# Patient Record
Sex: FEMALE | Race: WHITE | NOT HISPANIC OR LATINO | Employment: FULL TIME | URBAN - METROPOLITAN AREA
[De-identification: names, ages, dates, MRNs, and addresses within clinical notes are randomized per-mention and may not be internally consistent; named-entity substitution may affect disease eponyms.]

---

## 2021-03-08 ENCOUNTER — APPOINTMENT (OUTPATIENT)
Dept: GENERAL RADIOLOGY | Facility: HOSPITAL | Age: 26
End: 2021-03-08

## 2021-03-08 ENCOUNTER — HOSPITAL ENCOUNTER (EMERGENCY)
Facility: HOSPITAL | Age: 26
Discharge: HOME | End: 2021-03-08
Attending: EMERGENCY MEDICINE | Admitting: EMERGENCY MEDICINE

## 2021-03-08 VITALS
SYSTOLIC BLOOD PRESSURE: 132 MMHG | RESPIRATION RATE: 16 BRPM | DIASTOLIC BLOOD PRESSURE: 72 MMHG | HEART RATE: 96 BPM | TEMPERATURE: 97.5 F | OXYGEN SATURATION: 98 %

## 2021-03-08 DIAGNOSIS — S42.292A HUMERAL HEAD FRACTURE, LEFT, CLOSED, INITIAL ENCOUNTER: Primary | ICD-10-CM

## 2021-03-08 PROCEDURE — 99283 EMERGENCY DEPT VISIT LOW MDM: CPT

## 2021-03-08 PROCEDURE — 73030 X-RAY EXAM OF SHOULDER: CPT

## 2021-03-08 RX ORDER — LAMOTRIGINE 100 MG/1
100 TABLET ORAL DAILY
COMMUNITY
End: 2023-03-03

## 2021-03-08 RX ORDER — ESCITALOPRAM OXALATE 10 MG/1
10 TABLET ORAL DAILY
COMMUNITY
End: 2022-11-23

## 2021-03-08 NOTE — ED TRIAGE NOTES
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  Patient was wearing a face mask throughout encounter.  I wore personal protective equipment throughout the encounter.  Hand hygiene was performed before and after patient encounter.

## 2021-03-08 NOTE — ED PROVIDER NOTES
12:42 EST  Patient seen and examined with physician assistant.  Briefly patient presents for evaluation of left shoulder injury.  Patient states he is a  and fell on her left shoulder.  Patient is right-hand dominant.  Pain with movement of left shoulder.  Has had no weakness or numbness in her arm.  No head injury or neck pain.        On exam patient has tenderness to her left shoulder.  Has normal strength in her hand and normal sensation.      X-ray shows greater trochanter fracture        Plan will be sling and follow-up with orthopedist      MD ATTESTATION NOTE    The BUDDY and I have discussed this patient's history, physical exam, and treatment plan.  I have reviewed the documentation and personally had a face to face interaction with the patient. I affirm the documentation and agree with the treatment and plan.  The attached note describes my personal findings.      Patient was wearing a face mask when I entered the room and they continued to wear a mask throughout their stay in the ED.  I wore PPE, including  gloves, face mask with shield or face mask with goggles whenever I was in the room with patient.      Evan Fish MD  03/08/21 0052

## 2021-03-08 NOTE — PROGRESS NOTES
Entered room, introduced self and explained role w/mask in place on self and patient. Verified information on facesheet and that patient is currently without a PCP. Provided [facility] provider list and instructed patient on contact information to obtain an appt. No further needs at this time. Lesly Hartman RN

## 2021-03-08 NOTE — ED PROVIDER NOTES
EMERGENCY DEPARTMENT ENCOUNTER    Room Number:  05/05  Date of encounter:  3/8/2021  PCP: Provider, No Known  Historian: Patient      I used full protective equipment while examining this patient.  This includes face mask, gloves and protective eyewear.  I washed my hands before entering the room and immediately upon leaving the room      HPI:  Chief Complaint: Left shoulder injury  A complete HPI/ROS/PMH/PSH/SH/FH are unobtainable due to: Nothing    Context: Lexie Gonzalez is a 26 y.o. female who presents to the ED c/o left shoulder injury last night.  Patient is a  and injured her left shoulder during a match last night.  Patient was on her hands and knees when another wrestler jumped onto her back.  Patient's left shoulder was forcibly pushed posteriorly.  Today patient has had pain and decreased range of motion.  She denies any numbness or tingling to her distal arm.  She denies any other injuries from the night.  She is right-handed.  She denies any previous left shoulder injuries.    Review of Medical Records  Reviewed patient's urgent care visit from 12/13/2020.  Patient being tested for COVID-19.    PAST MEDICAL HISTORY  Active Ambulatory Problems     Diagnosis Date Noted   • No Active Ambulatory Problems     Resolved Ambulatory Problems     Diagnosis Date Noted   • No Resolved Ambulatory Problems     No Additional Past Medical History         PAST SURGICAL HISTORY  No past surgical history on file.      FAMILY HISTORY  No family history on file.      SOCIAL HISTORY  Social History     Socioeconomic History   • Marital status: Single     Spouse name: Not on file   • Number of children: Not on file   • Years of education: Not on file   • Highest education level: Not on file         ALLERGIES  Patient has no known allergies.        REVIEW OF SYSTEMS  All systems reviewed and negative except for those discussed in HPI.       PHYSICAL EXAM    I have reviewed the triage vital signs and nursing  notes.    ED Triage Vitals [03/08/21 1030]   Temp Heart Rate Resp BP SpO2   97.5 °F (36.4 °C) 96 16 -- 98 %      Temp src Heart Rate Source Patient Position BP Location FiO2 (%)   Tympanic Monitor -- -- --       Physical Exam  GENERAL: Alert, oriented, not distressed  HENT: head atraumatic, no nuchal rigidity  EYES: no scleral icterus, EOMI  CV: regular rhythm, regular rate, no murmur  RESPIRATORY: normal effort, CTA  ABDOMEN: soft, nontender  MUSCULOSKELETAL: Moderate diffuse swelling to left shoulder.  Decreased range of motion.  Moderate diffuse tenderness.  Left elbow normal.  Neurovascular intact distally.  NEURO: alert, moves all extremities, follows commands  SKIN: warm, dry    RADIOLOGY  XR Shoulder 2+ View Left    Result Date: 3/8/2021  LEFT SHOULDER X-RAY  HISTORY: Shoulder injury yesterday.  TECHNIQUE: A total of three images of the left shoulder are provided.  FINDINGS: There is a nondisplaced fracture across the base of the proximal humerus greater tuberosity. No transverse fracture across the metaphysis is appreciated. Humeral head appears normally located. The visualized portions of the clavicle, scapula, and left thoracic cage appear normal. The joints around the shoulder appear normal as well.      Acute, nondisplaced fracture of the left humeral greater tuberosity.  This report was finalized on 3/8/2021 11:51 AM by Dr. Naveen Junior M.D.        I ordered the above noted radiological studies. Reviewed by me and discussed with radiologist.  See dictation for official radiology interpretation.      PROCEDURES  Patient placed in sling and swath by ER technician.  Supervised by myself.  Neurovascular intact distally.    MEDICATIONS GIVEN IN ER    Medications - No data to display      PROGRESS, DATA ANALYSIS, CONSULTS, AND MEDICAL DECISION MAKING    All labs have been independently reviewed by me.  All radiology studies have been reviewed by me and discussed with radiologist dictating the report.    EKG's independently viewed and interpreted by me.  Discussion below represents my analysis of pertinent findings related to patient's condition, differential diagnosis, treatment plan and final disposition.    I have discussed case with Dr. Fish, emergency room physician.  He has performed his own bedside examination and agrees with treatment plan.    ED Course as of Mar 08 1457   Mon Mar 08, 2021   1104 Patient presents with left shoulder injury after wrestling accident last night.  Plan for x-rays and reevaluation.  Differential diagnoses include but not limited to fracture, dislocation, RTC strain.    [EE]   1145 Left shoulder x-rays interpreted by myself show a nondisplaced greater tuberosity fracture.  I will await final radiologist interpretation.    [EE]   1201 Updated patient on x-ray findings.  She agrees with discharge plan.  We will have her follow-up with an orthopedic doctor.  She declines pain medication prescription.    [EE]      ED Course User Index  [EE] Anthony Hartley PA       AS OF 14:57 EST VITALS:    BP - 132/72  HR - 96  TEMP - 97.5 °F (36.4 °C) (Tympanic)  O2 SATS - 98%        DIAGNOSIS  Final diagnoses:   Humeral head fracture, left, closed, initial encounter         DISPOSITION  Discharged           Anthony Hartley PA  03/08/21 2277

## 2021-03-10 ENCOUNTER — TELEPHONE (OUTPATIENT)
Dept: ORTHOPEDIC SURGERY | Facility: CLINIC | Age: 26
End: 2021-03-10

## 2021-03-12 ENCOUNTER — OFFICE VISIT (OUTPATIENT)
Dept: ORTHOPEDIC SURGERY | Facility: CLINIC | Age: 26
End: 2021-03-12

## 2021-03-12 VITALS — TEMPERATURE: 98 F | WEIGHT: 155 LBS | BODY MASS INDEX: 23.49 KG/M2 | HEIGHT: 68 IN

## 2021-03-12 DIAGNOSIS — S49.92XA INJURY OF LEFT SHOULDER, INITIAL ENCOUNTER: Primary | ICD-10-CM

## 2021-03-12 PROCEDURE — 99203 OFFICE O/P NEW LOW 30 MIN: CPT | Performed by: NURSE PRACTITIONER

## 2021-03-12 PROCEDURE — 73030 X-RAY EXAM OF SHOULDER: CPT | Performed by: NURSE PRACTITIONER

## 2021-03-16 NOTE — PROGRESS NOTES
History & Physical     Patient: Lexie Gonzalez    YOB: 1995    Medical Record Number: [id number]    Chief Complaints: Left shoulder injury    History of Present Illness: 26 y.o. female presents for evaluation of the left shoulder.  She experienced a wrestling injury 5 days ago.  She was down on the mat when an opponent came down on her back near the shoulder.  She experienced immediate pain and was promptly removed from the wrestling ring.  She was evaluated with x-rays the following day at [facility] emergency department.   She was placed in a sling and referred to me for further evaluation.  Describes her current pain is mild, constant, aching. The pain is worse with movement.  The pain is better with rest, pain medicine and use of the sling.  Denies any other injuries or complaints.  The patient is right hand dominant.    Allergies: No Known Allergies    Home Medications:      Current Outpatient Medications:   •  escitalopram (LEXAPRO) 10 MG tablet, Take 10 mg by mouth Daily., Disp: , Rfl:   •  lamoTRIgine (LaMICtal) 100 MG tablet, Take 100 mg by mouth Daily., Disp: , Rfl:     Past Medical History:   Diagnosis Date   • Fracture, foot     right          Past Surgical History:   Procedure Laterality Date   • WISDOM TOOTH EXTRACTION            Social History     Occupational History   • Not on file   Tobacco Use   • Smoking status: Never Smoker   • Smokeless tobacco: Never Used   Vaping Use   • Vaping Use: Never used   Substance and Sexual Activity   • Alcohol use: Never   • Drug use: Never   • Sexual activity: Defer      Social History     Social History Narrative   • Not on file          Family History   Problem Relation Age of Onset   • No Known Problems Mother    • No Known Problems Father    • No Known Problems Sister    • No Known Problems Brother    • No Known Problems Maternal Aunt    • No Known Problems Maternal Uncle    • No Known Problems Paternal Aunt    • No Known Problems  "Paternal Uncle    • No Known Problems Maternal Grandmother    • No Known Problems Maternal Grandfather    • No Known Problems Paternal Grandmother    • No Known Problems Paternal Grandfather    • No Known Problems Other    • Anesthesia problems Neg Hx    • Broken bones Neg Hx    • Cancer Neg Hx    • Clotting disorder Neg Hx    • Collagen disease Neg Hx    • Diabetes Neg Hx    • Dislocations Neg Hx    • Osteoporosis Neg Hx    • Rheumatologic disease Neg Hx    • Scoliosis Neg Hx    • Severe sprains Neg Hx        Review of Systems:      Constitutional: Denies fever, shaking or chills   Eyes: Denies change in visual acuity   HEENT: Denies nasal congestion or sore throat   Respiratory: Denies cough or shortness of breath   Cardiovascular: Denies chest pain or edema  Endocrine: Denies tremors, palpitations, intolerance of heat or cold, polyuria, polydipsia.  GI: Denies abdominal pain, nausea, vomiting, bloody stools or diarrhea  : Denies frequency, urgency, incontinence, retention, or nocturia.  Musculoskeletal: Denies numbness tingling or loss of motor function except as above  Integument: Denies rash, lesion or ulceration   Neurologic: Denies headache or focal weakness, deficits  Heme: Denies epistaxis, spontaneous or excessive bleeding, epistaxis, hematuria, melena, fatigue, enlarged or tender lymph nodes.      All other pertinent positives and negatives as noted above in HPI.    Physical Exam: 26 y.o. female    Vitals:    03/12/21 1456   Temp: 98 °F (36.7 °C)   Weight: 70.3 kg (155 lb)   Height: 172.7 cm (68\")       General:  Patient is awake and alert.  Appears in no acute distress or discomfort.    Psych:  Affect and demeanor are appropriate.    Eyes:  Conjunctiva and sclera appear grossly normal.  Eyes track well and EOM seem to be intact.    Dentition:  No gross abnormalities noted.    Ears:  No gross abnormalities.  Hearing adequate for the exam.    Cardiovascular:  Regular rate and rhythm.    Lungs:  Good " chest expansion.  Breathing unlabored.    Lymph:  No palpable masses or adenopathy in the affected extremity    Left upper extremity:  Sling was in place and removed.  Skin appears benign.  No gross malalignment, lacerations or abrasions.  Focal tenderness noted diffusely about the shoulder.  No palpable masses or adenopathy.  Compartments soft.  Painful, limited ROM of the shoulder.  Could not assess stability.  No tenderness at the elbow, forearm, wrist or hand.  Good strength in the wrist with flexion and extension as well as , pinch, finger and thumb abduction strength.  Intact sensation.  Brisk cap refill.  Palpable radial pulse.      Diagnostic Tests:  No results found for: GLUCOSE, CALCIUM, NA, K, CO2, CL, BUN, CREATININE, EGFRIFAFRI, EGFRIFNONA, BCR, ANIONGAP  No results found for: WBC, HGB, HCT, MCV, PLT  No results found for: INR, PROTIME    Imaging:  AP, scapular Y, and axillary views of the left shoulder are ordered by myself and reviewed to evaluate the patient's complaint.  These are compared to previous outside x-rays.  The x-rays show what appears to be a nondisplaced fracture at the base of the greater tuberosity.  Otherwise, no lesions, masses, significant degenerative changes, or other concerning findings.  The acromiohumeral interval is normal.    Assessment:  Left shoulder injury, suspect proximal humerus fracture    Plan: We had a thorough discussion regarding the risks of non-surgical management versus surgery.  I explained that there is good evidence that these fractures can heal with conservative treatment.  I further explained that it is very important to stay in the sling and minimize movement in the shoulder for now.  With closed treatment, there is the risk of further displacement, malunion, nonunion or persistent pain or loss of motion/function that could require further intervention in the future.  I consider that this injury is amenable to closed treatment.  The patient voiced  understanding of the risks, benefits, and alternative forms of treatment that were discussed and the patient consents to proceed with closed treatment.  The patient is instructed to continue use of the sling which she may remove for showering and dressing.  She may continue to use ice, ibuprofen, or Tylenol for pain.  The risks of these medications were discussed.  She is very concerned about when she will be able to return to wrestling.  I explained with this type of injury, she should expect to be out of wrestling for 3 months.  She will follow up with Dr. Muñoz in 1 week with repeat x-rays.  All questions posed by the patient were answered in detail.    Date: 3/16/2021    SHARI Valenzuela    CC to Provider, No Known

## 2021-03-19 ENCOUNTER — OFFICE VISIT (OUTPATIENT)
Dept: ORTHOPEDIC SURGERY | Facility: CLINIC | Age: 26
End: 2021-03-19

## 2021-03-19 VITALS — BODY MASS INDEX: 23.49 KG/M2 | TEMPERATURE: 97.2 F | WEIGHT: 155 LBS | HEIGHT: 68 IN

## 2021-03-19 DIAGNOSIS — M25.512 LEFT SHOULDER PAIN, UNSPECIFIED CHRONICITY: Primary | ICD-10-CM

## 2021-03-19 PROCEDURE — 99214 OFFICE O/P EST MOD 30 MIN: CPT | Performed by: ORTHOPAEDIC SURGERY

## 2021-03-19 PROCEDURE — 23620 CLTX GR HMRL TBRS FX WO MNPJ: CPT | Performed by: ORTHOPAEDIC SURGERY

## 2021-03-19 PROCEDURE — 73030 X-RAY EXAM OF SHOULDER: CPT | Performed by: ORTHOPAEDIC SURGERY

## 2021-03-19 NOTE — PROGRESS NOTES
Patient:Lexie Gonzalez    YOB: 1995    Medical Record Number:[id number]    Chief Complaints: Left shoulder injury    History of Present Illness:     26 y.o. female patient who presents for a left shoulder injury.  She was referred by Yadira.  She is a .  She fell onto her left shoulder approximately 2 weeks ago.  She was seen in the emergency room diagnosed with a proximal humerus fracture.  She was placed in a sling.  She has been compliant with wearing the sling.  Current pain is described as mild and aching.  She says that her pain has gotten much better since the injury.  She is right-hand dominant.    Allergies:No Known Allergies    Home Medications:    Current Outpatient Medications:   •  escitalopram (LEXAPRO) 10 MG tablet, Take 10 mg by mouth Daily., Disp: , Rfl:   •  lamoTRIgine (LaMICtal) 100 MG tablet, Take 100 mg by mouth Daily., Disp: , Rfl:     Past Medical History:   Diagnosis Date   • Fracture, foot     right       Past Surgical History:   Procedure Laterality Date   • WISDOM TOOTH EXTRACTION         Social History     Occupational History   • Not on file   Tobacco Use   • Smoking status: Never Smoker   • Smokeless tobacco: Never Used   Vaping Use   • Vaping Use: Never used   Substance and Sexual Activity   • Alcohol use: Never   • Drug use: Never   • Sexual activity: Defer      Social History     Social History Narrative   • Not on file       Family History   Problem Relation Age of Onset   • No Known Problems Mother    • No Known Problems Father    • No Known Problems Sister    • No Known Problems Brother    • No Known Problems Maternal Aunt    • No Known Problems Maternal Uncle    • No Known Problems Paternal Aunt    • No Known Problems Paternal Uncle    • No Known Problems Maternal Grandmother    • No Known Problems Maternal Grandfather    • No Known Problems Paternal Grandmother    • No Known Problems Paternal Grandfather    • No Known Problems Other    •  "Anesthesia problems Neg Hx    • Broken bones Neg Hx    • Cancer Neg Hx    • Clotting disorder Neg Hx    • Collagen disease Neg Hx    • Diabetes Neg Hx    • Dislocations Neg Hx    • Osteoporosis Neg Hx    • Rheumatologic disease Neg Hx    • Scoliosis Neg Hx    • Severe sprains Neg Hx        Review of Systems:      Constitutional: Denies fever, shaking or chills   Eyes: Denies change in visual acuity   HEENT: Denies nasal congestion or sore throat   Respiratory: Denies cough or shortness of breath   Cardiovascular: Denies chest pain or edema  Endocrine: Denies tremors, palpitations, intolerance of heat or cold, polyuria, polydipsia.  GI: Denies abdominal pain, nausea, vomiting, bloody stools or diarrhea  : Denies frequency, urgency, incontinence, retention, or nocturia.  Musculoskeletal: Denies numbness, tingling or loss of motor function except as above  Integument: Denies rash, lesion or ulceration   Neurologic: Denies headache or focal weakness, deficits  Heme: Denies spontaneous or excessive bleeding, epistaxis, hematuria, melena, fatigue, enlarged or tender lymph nodes.      All other pertinent positives and negatives as noted above in HPI.    Physical Exam:26 y.o. female  Vitals:    03/19/21 1533   Temp: 97.2 °F (36.2 °C)   Weight: 70.3 kg (155 lb)   Height: 172.7 cm (68\")       General:  Patient is awake and alert.  Appears in no acute distress or discomfort.    Psych:  Affect and demeanor are appropriate.    Extremities: Left shoulder is examined.  Skin is benign.  Moderate tenderness at the proximal humerus.  No step-offs or palpable defects.  Pendulums and short arc passive motion are well-tolerated.  Her arm and forearm are soft and easily compressible.  No tenderness in her lower arm, elbow, forearm, wrist or hand.  Good motor and sensory function in her wrist and hand.  Brisk capillary refill.    Imaging: AP and scapular Y views left shoulder are ordered and reviewed.  These are compared to previous " x-rays.  She has what appears to be a nondisplaced greater tuberosity fracture.  There has been no change in alignment relative to previous films.    Assessment/Plan: Nondisplaced left greater tuberosity fracture    I recommend conservative treatment.  She can start to work on pendulum exercises.  She was counseled about how to perform these.  We discussed appropriate activity modification and restrictions.  I recommend that she continue use of the sling for now.  I will see her back in 2 weeks for repeat x-rays.    Santiago Muñoz MD    03/19/2021

## 2021-04-16 ENCOUNTER — OFFICE VISIT (OUTPATIENT)
Dept: ORTHOPEDIC SURGERY | Facility: CLINIC | Age: 26
End: 2021-04-16

## 2021-04-16 VITALS — TEMPERATURE: 97.8 F | BODY MASS INDEX: 23.49 KG/M2 | WEIGHT: 155 LBS | HEIGHT: 68 IN

## 2021-04-16 DIAGNOSIS — Z09 FRACTURE FOLLOW-UP: ICD-10-CM

## 2021-04-16 DIAGNOSIS — R52 PAIN: Primary | ICD-10-CM

## 2021-04-16 PROCEDURE — 99024 POSTOP FOLLOW-UP VISIT: CPT | Performed by: ORTHOPAEDIC SURGERY

## 2021-04-16 PROCEDURE — 73030 X-RAY EXAM OF SHOULDER: CPT | Performed by: ORTHOPAEDIC SURGERY

## 2021-04-16 NOTE — PROGRESS NOTES
"Lexie Gonzalez : 1995 MRN: [id number] DATE: 2021    CC: Closed treatment of left proximal humerus fracture    HPI: Pt. returns to clinic today for follow-up.  Reports that the pain continues to improve.  Denies any new concerns or issues.    Vitals:    21 1346   Temp: 97.8 °F (36.6 °C)   Weight: 70.3 kg (155 lb)   Height: 172.7 cm (68\")       Exam:  Contour of shoulder appears normal.  Skin intact and benign.  Arm and forearm soft.  Shoulder motion is limited but well tolerated.  Good motor and sensory function distally.  Palpable pulse with good cap refill.      Imaging  AP and scapular Y views of the shoulder are ordered and reviewed by me to evaluate alignment and for comparison purposes.  No new or concerning findings noted.  Fracture appears to be healing.  No change in alignment.    Impression:   6 weeks s/p closed treatment of left proximal humerus fracture    Plan:    1.  Can begin to work on progressive range of motion.  Start formal PT.  2.  F/u in 6 weeks with repeat 2v xrays.  3.  Counseled the patient about appropriate activity modifications and restrictions, including no heavy lifting, pushing or pulling  4.  Follow-up in 6 weeks.    Santiago Muñoz MD    "

## 2021-04-21 ENCOUNTER — TREATMENT (OUTPATIENT)
Dept: PHYSICAL THERAPY | Facility: CLINIC | Age: 26
End: 2021-04-21

## 2021-04-21 DIAGNOSIS — Z78.9 IMPAIRED MOBILITY AND ADLS: ICD-10-CM

## 2021-04-21 DIAGNOSIS — Z74.09 IMPAIRED MOBILITY AND ADLS: ICD-10-CM

## 2021-04-21 DIAGNOSIS — M25.512 ACUTE PAIN OF LEFT SHOULDER: Primary | ICD-10-CM

## 2021-04-21 PROCEDURE — 97161 PT EVAL LOW COMPLEX 20 MIN: CPT | Performed by: PHYSICAL THERAPIST

## 2021-04-21 NOTE — PROGRESS NOTES
Physical Therapy Initial Evaluation and Plan of Care      Patient: Lexie Gonzalez   : 1995  Diagnosis/ICD-10 Code:  Acute pain of left shoulder [M25.512]  Referring practitioner: Santiago Muñoz MD  Date of Initial Visit: 2021  Today's Date: 2021  Patient seen for 1 sessions           Subjective Evaluation    History of Present Illness  Mechanism of injury: Patient injured her L shoulder on  when she fell while wrestling. Patient was in quadruped and her opponent placed his bodyweight on her when he slammed his body onto her back. The injury caused a proximal humeral head fracture. Patient was placed in a sling and managed conservatively.     Patient is out of the sling and was educated to avoid lifting and puling with her left arm.     She has discomfort with putting on her shirt. She describes the pain as an ache. She also has pain with shoulder flexion and ER.      Patient Occupation:  Pain  Current pain ratin  At best pain ratin  At worst pain rating: 3  Location: L shoulder  Quality: dull ache  Relieving factors: ice, relaxation, rest and support  Aggravating factors: overhead activity, lifting and repetitive movement  Progression: improved    Social Support  Lives with: two roommates.    Hand dominance: right    Diagnostic Tests  Abnormal x-ray: healing proximal humeral head frature.    Treatments  Previous treatment: immobilization (sling)  Patient Goals  Patient goals for therapy: increased strength, decreased pain, increased motion, return to sport/leisure activities and return to work             Objective          Static Posture     Comments  L Shoulder slightly elevated compared to R. Increased thoracic kyphosis, rounded shoulders, forward head,    Active Range of Motion   Left Shoulder   Flexion: 135 degrees with pain  Abduction: 110 degrees with pain  External rotation 0°: 74 degrees   Internal rotation BTB: T8     Right Shoulder   Flexion: 180  degrees WFL  Abduction: 180 degrees WFL  External rotation 0°: 75 degrees   Internal rotation BTB: T8     Joint Play   Left Shoulder  Hypermobile in the anterior capsule, posterior capsule and inferior capsule.     Strength/Myotome Testing     Left Shoulder     Planes of Motion   Flexion: 4-   Abduction: 4-   External rotation at 0°: 4   Internal rotation at 0°: 4+     Isolated Muscles   Biceps: 4+     Right Shoulder     Planes of Motion   Flexion: 5   Abduction: 5   External rotation at 0°: 4+   Internal rotation at 0°: 5     Isolated Muscles   Biceps: 5         See Exercise, Manual, and Modality Logs for complete treatment.       Functional Outcome Score: DASH: 15.6% disability      Assessment & Plan     Assessment  Impairments: abnormal or restricted ROM, activity intolerance, impaired physical strength, lacks appropriate home exercise program and pain with function  Assessment details: Lexie Gonzalez is a 26 y.o. 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 She presents with a evolving clinical presentation.  She has no relevant co morbidities. Personal factors include patient having a very physically demanding job that she would like to return to.  Signs and symptoms are consistent with physical therapy diagnosis of acute L shoulder pain and difficulty with ADLs. Patient is appropriate for skilled physical therapy in order to reduce pain and increase ease with daily mobility.   Prognosis: good  Functional Limitations: carrying objects, lifting, sleeping, pulling, pushing, uncomfortable because of pain, reaching behind back and reaching overhead  Goals  Plan Goals: STGs (To be completed in 30 days)    -Patient will demonstrate compliance and independence with initial HEP  -Patient will increase L shoulder flexion AROM to 160 degrees or greater to increase ease with overhead activitiy  -Patient will report reduction  in pain to <2/10 to allow better quality of sleep    LTGs (To be completed in 90 days)  -Patient will be knowledgeable with advanced HEP to facilitate independent management of condition  -Patient will increase L shoulder flexion AROM to 170 degrees or greater to increase ease with overhead activitiy  -Patient will reduce perceived disability on DASH from 15.6% disability at eval to <10% or less to improve quality of life    Plan  Therapy options: will be seen for skilled physical therapy services  Planned modality interventions: TENS and ultrasound  Planned therapy interventions: ADL retraining, flexibility, transfer training, stretching, strengthening, postural training, manual therapy, soft tissue mobilization, therapeutic activities, home exercise program, functional ROM exercises, joint mobilization and motor coordination training  Frequency: 36 visits.  Plan details: Skilled PT for Shoulder ROM/Strength, joint mobility, ADLs retraining, and posture. Manual therapy and modalities PRN for pain relief.        Timed:  Manual Therapy:         mins  72119;  Therapeutic Exercise:         mins  66938;     Neuromuscular Rodriguez:        mins  67875;    Therapeutic Activity:          mins  46876;     Gait Training:           mins  98275;     Ultrasound:          mins  81154;    Electrical Stimulation:         mins  86748 ( );  Iontophoresis         mins 31856  Dry Needling        mins      Untimed:  Electrical Stimulation:         mins  74789 ( );  Mechanical Traction:         mins  96251;     Timed Treatment:   0   mins   Total Treatment:     30   mins    PT SIGNATURE: Amira Do PT   DATE TREATMENT INITIATED: 4/21/2021    Initial Certification  Certification Period: 7/20/2021  I certify that the therapy services are furnished while this patient is under my care.  The services outlined above are required by this patient, and will be reviewed every 90 days.     PHYSICIAN: Santiago Muñoz MD      DATE:      Please sign and return via fax to [phone] Thank you, [facility].

## 2021-04-29 ENCOUNTER — TREATMENT (OUTPATIENT)
Dept: PHYSICAL THERAPY | Facility: CLINIC | Age: 26
End: 2021-04-29

## 2021-04-29 DIAGNOSIS — Z78.9 IMPAIRED MOBILITY AND ADLS: ICD-10-CM

## 2021-04-29 DIAGNOSIS — Z74.09 IMPAIRED MOBILITY AND ADLS: ICD-10-CM

## 2021-04-29 DIAGNOSIS — M25.512 ACUTE PAIN OF LEFT SHOULDER: Primary | ICD-10-CM

## 2021-04-29 PROCEDURE — 97140 MANUAL THERAPY 1/> REGIONS: CPT | Performed by: PHYSICAL THERAPIST

## 2021-04-29 PROCEDURE — 97110 THERAPEUTIC EXERCISES: CPT | Performed by: PHYSICAL THERAPIST

## 2021-04-29 NOTE — PROGRESS NOTES
Physical Therapy Daily Progress Note    Patient: Lexie Gonzalez   : 1995  Diagnosis/ICD-10 Code:  Acute pain of left shoulder [M25.512]  Referring practitioner: Santiago Muñoz MD  Date of Initial Visit: Type: THERAPY  Noted: 2021  Today's Date: 2021  Patient seen for 2 sessions           Subjective   Patient reports her shoulder is feeling a lot better but it's a little challenging still for her to hook her bra.    Objective   See Exercise, Manual, and Modality Logs for complete treatment.     EXERCISES:  -Cane flex/ER AAROM 10x10 sec  -Sidelying ER 2 lb, 2x15  -Supine shoulder ABCs, arm at 90 degrees flex, x1 with 1 lb weight  -Sleeper stretch IR, 4x20 sec  -B Shoulder Horizontal abduction in supine with RTB, 2x15  -B Shoulder ER in supine with RTB, 2x15  -Shoudler flexion to 90 degrees in sitting, 2 lbs, 2x15  -Cable Rows 15 lbs, 2x15  -Cable Shoulder extension 20- lbs, 2x15    MANUAL:  L Shoulder PROM with gentle overpressure at eng range. Grade III A-P and Sup-Inf joint mobilizations with gentle oscillations in between for pain.    Assessment/Plan  Patient demonstrates nearly full L Shoulder PROM in supine. Significant crackling noted on eccentric phase of flexion/abduction when initiating manual therapy but the movement became more smooth and less painful after performing joint mobilizations and gentle oscillations. No pain noted with shoulder exercises but patient reports difficulty with B shoulder ER due to muscle weakness.         Timed:    Manual Therapy:    15     mins  12238;  Therapeutic Exercise:    25     mins  30966;     Neuromuscular Rodriguez:        mins  04025;    Therapeutic Activity:          mins  79784;     Gait Training:           mins  43315;     Ultrasound:          mins  85598;    Electrical Stimulation:         mins  41791 ( );  Iontophoresis         mins 31736;  Aquatic Therapy         mins 91632;  Dry Needling                   mins    Untimed:  Electrical  Stimulation:         mins  72168 ( );  Mechanical Traction:         mins  36759;     Timed Treatment:   40   mins   Total Treatment:     40   mins  Amira Do PT  Physical Therapist

## 2021-05-03 ENCOUNTER — TELEPHONE (OUTPATIENT)
Dept: ORTHOPEDIC SURGERY | Facility: CLINIC | Age: 26
End: 2021-05-03

## 2021-05-03 ENCOUNTER — TREATMENT (OUTPATIENT)
Dept: PHYSICAL THERAPY | Facility: CLINIC | Age: 26
End: 2021-05-03

## 2021-05-03 DIAGNOSIS — Z78.9 IMPAIRED MOBILITY AND ADLS: ICD-10-CM

## 2021-05-03 DIAGNOSIS — M25.512 ACUTE PAIN OF LEFT SHOULDER: Primary | ICD-10-CM

## 2021-05-03 DIAGNOSIS — Z74.09 IMPAIRED MOBILITY AND ADLS: ICD-10-CM

## 2021-05-03 PROCEDURE — 97140 MANUAL THERAPY 1/> REGIONS: CPT | Performed by: PHYSICAL THERAPIST

## 2021-05-03 PROCEDURE — 97110 THERAPEUTIC EXERCISES: CPT | Performed by: PHYSICAL THERAPIST

## 2021-05-03 NOTE — PATIENT INSTRUCTIONS
Access Code: [email or web address]  URL: [email or web address]  Date: 05/03/2021  Prepared by: Citlali Sepulveda    Exercises  Sidelying Shoulder External Rotation - 1 x daily - 2 sets - 10 reps - 3 hold  Prone Shoulder Row - 1 x daily - 2 sets - 10 reps - 3 hold  Prone Shoulder Extension - Single Arm - 1 x daily - 10 reps - 2 sets - 3 hold  Prone Single Arm Shoulder Horizontal Abduction with Scapular Retraction and Palm Down - 1 x daily - 2 sets - 10 reps - 3 hold  Shoulder Flexion Wall Slide with Towel - 1 x daily - 10 reps - 1 sets  Shoulder External Rotation and Scapular Retraction - 1 x daily - 15 reps - 1 sets - 5 second hold

## 2021-05-03 NOTE — PROGRESS NOTES
"Physical Therapy Daily Progress Note    Visit # : 3  Lexie Lisa reports: I am noticing some cracking in my shoulder when I move it but it's not too painful. I just got a new job and I start tomorrow from 7:30 am-6pm Monday-Thursday.    Subjective     Objective   See Exercise, Manual, and Modality Logs for complete treatment.   AAROM with dowel - L shoulder flex 168 degrees                                      L shoulder ER at 45 degrees - 85 degrees  EXERCISES:  - UBE - 2.5 min fwd/2.5 min bwd  - rhythmic stab at 90 degrees flex - 3 x 30 sec  -Cane flex/ER AAROM 10x10 sec  -Sidelying ER , 2x10  -Supine shoulder ABCs, arm at 90 degrees flex, x 1 with no weight and x 1 with 1 lb weight  -Sleeper stretch IR, 10 x10 sec  -B Shoulder ER in standing with scap retract x15  -Shoulder flexion to 90 degrees in standing, 2x10 - mirror for cuing  -prone rows (avoid hyperext), 2x10  -prone shoulder ext - palm in - 2x10  - prone L shoulder horizontal abduction, 2x10    MANUAL:  L Shoulder PROM with gentle overpressure at end range with gentle oscillations in between for pain.    Assessment/Plan  ROM is progressing well with mild c/o pain at end ranges.  Due to healing fracture, exercises were regressed today and patient tolerated well.  Pt was given a printout to focus on HEP as she will likely only be able to continue PT on Fridays due to her new work schedule that outside our office hours.  I suggested she contact her MD regarding any potential light duty restrictions (his previous note states \"no heavy lifting, pushing or pulling\") as her new job involves some lifting.   Progress per Plan of Care       Timed:  Manual Therapy:    12     mins  03163;  Therapeutic Exercise:    25     mins  20746;     Neuromuscular Rodriguez:    -    mins  52449;    Therapeutic Activity:     5     mins  23821;     Gait Training:      -     mins  99766;     Ultrasound:     -     mins  87561;    Iontophoresis                 -     mins " 64213    Untimed:  Electrical Stimulation:    -     mins  35280 ( );  Mech traction                   -     mins 70754  Dry Needling                  -     Min self pay    Timed Treatment:   42   mins   Total Treatment:     42   mins      Citlali Sepulveda PT  Physical Therapist  [place] License # [id number]

## 2021-05-03 NOTE — TELEPHONE ENCOUNTER
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

## 2021-05-04 NOTE — TELEPHONE ENCOUNTER
Letter in chart, tried calling patient but no voicemail box set up to leave a VM. Will try calling again later.

## 2021-05-07 ENCOUNTER — TREATMENT (OUTPATIENT)
Dept: PHYSICAL THERAPY | Facility: CLINIC | Age: 26
End: 2021-05-07

## 2021-05-07 ENCOUNTER — TELEPHONE (OUTPATIENT)
Dept: ORTHOPEDIC SURGERY | Facility: CLINIC | Age: 26
End: 2021-05-07

## 2021-05-07 DIAGNOSIS — M25.512 ACUTE PAIN OF LEFT SHOULDER: Primary | ICD-10-CM

## 2021-05-07 DIAGNOSIS — Z78.9 IMPAIRED MOBILITY AND ADLS: ICD-10-CM

## 2021-05-07 DIAGNOSIS — Z74.09 IMPAIRED MOBILITY AND ADLS: ICD-10-CM

## 2021-05-07 PROCEDURE — 97140 MANUAL THERAPY 1/> REGIONS: CPT | Performed by: PHYSICAL THERAPIST

## 2021-05-07 PROCEDURE — 97110 THERAPEUTIC EXERCISES: CPT | Performed by: PHYSICAL THERAPIST

## 2021-05-07 PROCEDURE — 97112 NEUROMUSCULAR REEDUCATION: CPT | Performed by: PHYSICAL THERAPIST

## 2021-05-07 NOTE — PROGRESS NOTES
Physical Therapy Daily Progress Note    Patient: Lexie Gonzalez   : 1995  Diagnosis/ICD-10 Code:  Acute pain of left shoulder [M25.512]  Referring practitioner: Santiago Muñoz MD  Date of Initial Visit: Type: THERAPY  Noted: 2021  Today's Date: 2021  Patient seen for 4 sessions           Subjective: Lexie reports pain, at its peak, 1-2/10, with movement of her L arm.     Objective     Treatment  1. UBE, seated, 2 work units, seat at 5, 2 1/2 minutes forward and 2 1/2 minutes reverse  2. Standing shoulder extension, neutral , red theraband, 10 x 2  3. Standing row, supinated , red theraband, 10 x 2  4. In supine, rhythmic stabilization, L shoulder, at 90 degrees flexion, 30s x 3  5. In supine, L shoulder at 90 degrees of flexion, 1 lb. 2 cycles of writing the alphabet  6. Manual therapy:  In supine, legs on bolster, PROM of the L shoulder, for scaption, ER (roll under upper arm positioned at 80 degrees), flexion and IR (roll under upper arm)  7. Self AAROM, wand flexion, 10s x 10  8. Self AAROM, wand ER, roll under L upper arm. 10s x 10  9. Sleeper stretch, L UE, 10s x 10  10. Behind the back stretch, 30s x 3, L UE  11. Patient education:  Red theraband was issued for shoulder extension and rowing, 3 x per week.  She also was issued behind the back stretch, once per day.    Assessment & Plan     Assessment  Assessment details: Lexie required verbal/tactile cues and instruction for exercise technique.  She was progressed with PREs and PROM today.  They were added to her HEP and she tolerated treatment well today.    Plan  Plan details: Monitor the effect of today's treatment and consider ER PRE next visit.           Timed:    Manual Therapy:    10     mins  25290;  Therapeutic Exercise:    25     mins  25423;     Neuromuscular Rodriguez:    10    mins  06343;    Therapeutic Activity:          mins  21724;     Gait Training:           mins  31558;     Ultrasound:          mins  82497;    Electrical  Stimulation:         mins  21469 ( );  Iontophoresis         mins 06156;  Aquatic Therapy         mins 09596;  Dry Needling                   mins    Untimed:  Electrical Stimulation:         mins  99476 ( );  Mechanical Traction:         mins  23327;     Timed Treatment:   45   mins   Total Treatment:     45   mins  Colin Sanchez PT  Physical Therapist

## 2021-05-07 NOTE — TELEPHONE ENCOUNTER
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 CAN YOU MAKE A NEW NOTE FOR HER WITH THESE RESTRICTIONS?

## 2021-05-21 ENCOUNTER — TREATMENT (OUTPATIENT)
Dept: PHYSICAL THERAPY | Facility: CLINIC | Age: 26
End: 2021-05-21

## 2021-05-21 DIAGNOSIS — M25.512 ACUTE PAIN OF LEFT SHOULDER: Primary | ICD-10-CM

## 2021-05-21 DIAGNOSIS — Z74.09 IMPAIRED MOBILITY AND ADLS: ICD-10-CM

## 2021-05-21 DIAGNOSIS — Z78.9 IMPAIRED MOBILITY AND ADLS: ICD-10-CM

## 2021-05-21 PROCEDURE — 97110 THERAPEUTIC EXERCISES: CPT | Performed by: PHYSICAL THERAPIST

## 2021-05-21 PROCEDURE — 97140 MANUAL THERAPY 1/> REGIONS: CPT | Performed by: PHYSICAL THERAPIST

## 2021-05-21 NOTE — PROGRESS NOTES
30-Day / 10-Visit Progress Note         Patient: Lexie Gonzalez   : 1995  Diagnosis/ICD-10 Code:  Acute pain of left shoulder [M25.512]  Referring practitioner: Santiago Muñoz MD  Date of Initial Visit: Type: THERAPY  Noted: 2021  Today's Date: 2021  Patient seen for 5 sessions      Subjective:     Clinical Progress: improved  Home Program Compliance: Yes  Treatment has included:  therapeutic exercise, manual therapy and patient education with home exercise program     Subjective   Objective          Active Range of Motion   Left Shoulder   Flexion: 140 degrees   Abduction: 155 degrees   External rotation BTH: T3   Internal rotation BTB: T9     Passive Range of Motion   Left Shoulder   Flexion: 163 degrees   Abduction: 170 degrees   External rotation 45°: 83 degrees   Internal rotation 45°: 80 degrees         See Exercise, Manual, and Modality Logs for complete treatment.     Exercises:  -table slides x10  -supine OH flexion with cane, 10x 10 sec  -AAROM ER with cane, 10x 10 sec  -PROM to L shoulder in all planes  -supine D2 flexion and horiz abd, red band, x20  -scap ret and shoulder ext, green band (given band for home), 2x10  -ER w/towel, red band, 2x10      Assessment & Plan     Assessment  Assessment details: Lexie Gonzalez has been seen for 5 physical therapy sessions for L greater tuberosity fracture.  Treatment has included therapeutic exercise, manual therapy and patient education with home exercise program . Progress to physical therapy goals is good. She has improved her ROM and her pain levels are staying low. Starting more resistance training as she continues with shoulder weakness.  She will benefit from continued skilled physical therapy to address remaining impairments and functional limitations.       Goals  Plan Goals: STGs (To be completed in 30 days)    -Patient will demonstrate compliance and independence with initial HEP (PART MET)  -Patient will increase L shoulder flexion AROM  to 160 degrees or greater to increase ease with overhead activity (ONGOING)   -Patient will report reduction in pain to <2/10 to allow better quality of sleep (MET)     LTGs (To be completed in 90 days)  -Patient will be knowledgeable with advanced HEP to facilitate independent management of condition (ONGOING)   -Patient will increase L shoulder flexion AROM to 170 degrees or greater to increase ease with overhead activity (ONGOING)   -Patient will reduce perceived disability on DASH from 15.6% disability at eval to <10% or less to improve quality of life (ONGOING)     Plan  Therapy options: will be seen for skilled physical therapy services  Frequency: 2x week  Duration in weeks: 8  Treatment plan discussed with: patient           Recommendations: Continue as planned  Timeframe: 2 months  Prognosis to achieve goals: good    PT Signature: Nancy Rios, PT      Based upon review of the patient's progress and continued therapy plan, it is my medical opinion that Lexie Gonzalez should continue physical therapy treatment at [facility]  [address] [place] [address] [address] [place] [address]  [phone].    Signature: __________________________________  Santiago Muñoz MD    Timed:  Manual Therapy:    10     mins  80011;  Therapeutic Exercise:    30     mins  31868;     Neuromuscular Rodriguez:        mins  48394;    Therapeutic Activity:          mins  38445;     Gait Training:           mins  78191;     Ultrasound:          mins  32775;    Electrical Stimulation:         mins  78272 ( );  Iontophoresis         mins 73828;  Dry Needling                   mins    Untimed:  Electrical Stimulation:         mins  68461 (MC );  Mechanical Traction:         mins  32377;     Timed Treatment:   40   mins   Total Treatment:     40   mins

## 2021-05-28 ENCOUNTER — TREATMENT (OUTPATIENT)
Dept: PHYSICAL THERAPY | Facility: CLINIC | Age: 26
End: 2021-05-28

## 2021-05-28 DIAGNOSIS — Z78.9 IMPAIRED MOBILITY AND ADLS: Primary | ICD-10-CM

## 2021-05-28 DIAGNOSIS — Z74.09 IMPAIRED MOBILITY AND ADLS: Primary | ICD-10-CM

## 2021-05-28 DIAGNOSIS — M25.512 ACUTE PAIN OF LEFT SHOULDER: ICD-10-CM

## 2021-05-28 PROCEDURE — 97140 MANUAL THERAPY 1/> REGIONS: CPT | Performed by: PHYSICAL THERAPIST

## 2021-05-28 PROCEDURE — 97110 THERAPEUTIC EXERCISES: CPT | Performed by: PHYSICAL THERAPIST

## 2021-05-28 NOTE — PROGRESS NOTES
Physical Therapy Daily Progress Note    Patient: Lexie Gonzalez   : 1995  Diagnosis/ICD-10 Code:  Impaired mobility and ADLs [Z74.09, Z78.9]  Referring practitioner: Santiago Muñoz MD  Date of Initial Visit: Type: THERAPY  Noted: 2021  Today's Date: 2021  Patient seen for 6 sessions           Subjective   Patient reports her L arm is feeling good. The right arm is hurting from her second covid vaccine.    Objective   See Exercise, Manual, and Modality Logs for complete treatment.     EXERCISES:  -Cane flex/ER AAROM 10x10 sec  -Sidelying ER 2 lb, 2x15  -Supine shoulder ABCs, arm at 90 degrees flex, x1 with 2 lb weight  -Sleeper stretch IR, 4x20 sec  -B Shoulder Horizontal abduction in supine with RTB, 2x15  -B Shoulder ER in supine with RTB, 2x15  -Shoudler flexion to 90 degrees in sitting, 2 lbs, 2x15  -supine D2 flexion and horiz abd, red band, 2x20  -Cable Rows 15 lbs, 2x15  -Cable Shoulder extension 20 lbs, 2x15     MANUAL:  L Shoulder PROM with gentle overpressure at eng range. Grade III A-P and Sup-Inf joint mobilizations with gentle oscillations in between for pain.    Assessment/Plan  Minor pop felt with initial PROM, most likely due to scar tissue, but overall PROM appears nearly WNL and GH joint feel normal at end range. Patient required cueing for proper angle of stretch with cane ER AAROM. No upper trap compensation noted with seated shoulder flexion.          Timed:    Manual Therapy:   10      mins  72261;  Therapeutic Exercise:    30     mins  93390;     Neuromuscular Rodriguez:        mins  79449;    Therapeutic Activity:          mins  85357;     Gait Training:          mins  57351;     Ultrasound:          mins  73824;    Electrical Stimulation:         mins  03399 ( );  Iontophoresis         mins 39941;  Aquatic Therapy         mins 83489;  Dry Needling                   mins    Untimed:  Electrical Stimulation:         mins  60194 ( );  Mechanical Traction:         mins   07842;     Timed Treatment:   40   mins   Total Treatment:     40   mins  Amira Do, PT  Physical Therapist

## 2021-06-04 ENCOUNTER — OFFICE VISIT (OUTPATIENT)
Dept: ORTHOPEDIC SURGERY | Facility: CLINIC | Age: 26
End: 2021-06-04

## 2021-06-04 ENCOUNTER — TREATMENT (OUTPATIENT)
Dept: PHYSICAL THERAPY | Facility: CLINIC | Age: 26
End: 2021-06-04

## 2021-06-04 VITALS — BODY MASS INDEX: 24.25 KG/M2 | TEMPERATURE: 97.8 F | HEIGHT: 68 IN | WEIGHT: 160 LBS

## 2021-06-04 DIAGNOSIS — Z78.9 IMPAIRED MOBILITY AND ADLS: Primary | ICD-10-CM

## 2021-06-04 DIAGNOSIS — Z09 FRACTURE FOLLOW-UP: ICD-10-CM

## 2021-06-04 DIAGNOSIS — R52 PAIN: Primary | ICD-10-CM

## 2021-06-04 DIAGNOSIS — M25.512 ACUTE PAIN OF LEFT SHOULDER: ICD-10-CM

## 2021-06-04 DIAGNOSIS — Z74.09 IMPAIRED MOBILITY AND ADLS: Primary | ICD-10-CM

## 2021-06-04 PROCEDURE — 73030 X-RAY EXAM OF SHOULDER: CPT | Performed by: ORTHOPAEDIC SURGERY

## 2021-06-04 PROCEDURE — 97140 MANUAL THERAPY 1/> REGIONS: CPT | Performed by: PHYSICAL THERAPIST

## 2021-06-04 PROCEDURE — 99024 POSTOP FOLLOW-UP VISIT: CPT | Performed by: ORTHOPAEDIC SURGERY

## 2021-06-04 PROCEDURE — 97110 THERAPEUTIC EXERCISES: CPT | Performed by: PHYSICAL THERAPIST

## 2021-06-04 NOTE — PROGRESS NOTES
"Lexie Gonzalez : 1995 MRN: [id number] DATE: 2021    CC:  3 months s/p closed treatment left proximal humerus fracture    HPI: Pt. returns to clinic today stating pain is improved.  Motion is back to normal.  Denies any new concerns or issues.  PT is helping.  She has 2 more sessions  Exam:    Vitals:    21 1307   Temp: 97.8 °F (36.6 °C)   TempSrc: Temporal   Weight: 72.6 kg (160 lb)   Height: 172.7 cm (68\")       Contour of shoulder appears normal.  Skin intact and benign.  Arm and forearm soft.  Motion is full.  She is weak, 5-/5, with elevation in the scapular plane and ER.  Good motor and sensory function distally.  Palpable radial pulse with good cap refill.      Imaginv xrays including AP, scapular Y views of the shoulder are ordered and reviewed by me to evaluate alignment and for comparison purposes.  No new or concerning findings noted. There has been interval callous formation.    Impression:  3 months s/p closed treatment left proximal humerus fracture    Plan:    1.  Progress ROM and strengthening as tolerated.  2.  Continue PT.  3.  No restrictions at this point.  4.  Follow up as needed.    Santiago Muñoz MD    2021   "

## 2021-06-04 NOTE — PROGRESS NOTES
30-Day / 10-Visit Progress Note         Patient: Lexie Gonzalez   : 1995  Diagnosis/ICD-10 Code:  Impaired mobility and ADLs [Z74.09, Z78.9]  Referring practitioner: Santiago Muñoz MD  Date of Initial Visit: Type: THERAPY  Noted: 2021  Today's Date: 2021  Patient seen for 7 sessions      Subjective:     Clinical Progress: improved  Home Program Compliance: Yes  Treatment has included:  therapeutic exercise, manual therapy and patient education with home exercise program     Subjective   Patient reports she is not having pain unless she does a lot of movement and even then it's a dull ache of 2/10    Objective     Active Range of Motion   Left Shoulder   Flexion: 165 degrees   Abduction: 166 degrees   External rotation 45°: 80 degrees   Internal rotation BTB: T4      Passive Range of Motion   Left Shoulder   Flexion: 163 degrees   Abduction: 170 degrees   External rotation 45°: 83 degrees   Internal rotation 45°: 80 degrees      Strength/Myotome Testing     Left Shoulder      Planes of Motion   Flexion: 4  Abduction: 4  External rotation at 0°: 4   Internal rotation at 0°: 4+      Isolated Muscles   Biceps: 4+     EXERCISES:  -Cane flex/ER AAROM 10x10 sec *defer  -Sidelying ER 3lb, 2x15  -Supine shoulder ABCs, arm at 90 degrees flex, x1 with 3 lb weight  -Sleeper stretch IR, 4x20 sec  -B Shoulder Horizontal abduction in supine with RTB, 2x15  -B Shoulder ER in supine with RTB, 2x15  -Shoudler flexion to 90 degrees in sitting, 3 lbs, 2x15  -supine D2 flexion and horiz abd, red band, 2x20  -Cable Rows 15 lbs, 2x15  -Cable Shoulder extension 20 lbs, 2x15  -Walk track with body blade doing rhythmic stabilization at ~70 degrees shoulder flex, 1 laps  -Standing cable ER with L UE, 2.5 lbs, 2x15  -P Lat Pull downs, 40 lbs, 2x15     MANUAL:  L Shoulder PROM with gentle overpressure at eng range. Grade III A-P and Sup-Inf joint mobilizations with gentle oscillations in between for pain.      See Exercise,  Manual, and Modality Logs for complete treatment.     Functional Outcome Score: DASH: 34 or 3.3% disability    Assessment/Plan    Assessment details: Lexie Gonzalez has been seen for 7 physical therapy sessions for L greater tuberosity fracture.  Treatment has included therapeutic exercise, manual therapy and patient education with home exercise program . Progress to physical therapy goals is good. Patient continues to report minimal to no pain. We are slowly progressing strength training. She still has some tenderness when reaching laterally but she doesn't have any difficulty with normal ADLs. She has normal ROM and good L shoulder strength with objective testing. She will benefit from continued skilled physical therapy to address remaining impairments and functional limitations.      Goals  Plan Goals: STGs (To be completed in 30 days)    -Patient will demonstrate compliance and independence with initial HEP (MET)  -Patient will increase L shoulder flexion AROM to 160 degrees or greater to increase ease with overhead activity (MET)   -Patient will report reduction in pain to <2/10 to allow better quality of sleep (MET)     LTGs (To be completed in 90 days)  -Patient will be knowledgeable with advanced HEP to facilitate independent management of condition (ONGOING)   -Patient will increase L shoulder flexion AROM to 170 degrees or greater to increase ease with overhead activity (ONGOING)   -Patient will reduce perceived disability on DASH from 15.6% disability at eval to <10% or less to improve quality of life (MET)      Plan  Therapy options: will be seen for skilled physical therapy services  Frequency: 2x week  Duration in weeks: 8  Treatment plan discussed with: patient           Recommendations: Continue as planned  Timeframe: 1 month  Prognosis to achieve goals: good    PT Signature: Amira Do PT      Based upon review of the patient's progress and continued therapy plan, it is my medical opinion that Lexie  Lisa should continue physical therapy treatment at [facility]  [address]   [place] [address] [phone].    Signature: __________________________________  Santiago Muñoz MD    Timed:  Manual Therapy:    10     mins  84658;  Therapeutic Exercise:    30     mins  04513;     Neuromuscular Rodriguez:        mins  09300;    Therapeutic Activity:          mins  84242;     Gait Training:           mins  02386;     Ultrasound:          mins  42489;    Electrical Stimulation:         mins  92866 ( );  Iontophoresis         mins 19461;  Dry Needling                   mins    Untimed:  Electrical Stimulation:         mins  70505 ( );  Mechanical Traction:         mins  62041;     Timed Treatment:   40   mins   Total Treatment:     40   mins

## 2021-06-25 ENCOUNTER — DOCUMENTATION (OUTPATIENT)
Dept: PHYSICAL THERAPY | Facility: CLINIC | Age: 26
End: 2021-06-25

## 2021-06-25 DIAGNOSIS — M25.512 ACUTE PAIN OF LEFT SHOULDER: ICD-10-CM

## 2021-06-25 DIAGNOSIS — Z78.9 IMPAIRED MOBILITY AND ADLS: Primary | ICD-10-CM

## 2021-06-25 DIAGNOSIS — Z74.09 IMPAIRED MOBILITY AND ADLS: Primary | ICD-10-CM

## 2021-06-25 NOTE — PROGRESS NOTES
Discharge Summary  Discharge Summary from Physical Therapy Report      Dates  PT visit: 4/21/21-6/4/21  Number of Visits: 7     Goals: Partially Met    Goals  Plan Goals: STGs (To be completed in 30 days)    -Patient will demonstrate compliance and independence with initial HEP (MET)  -Patient will increase L shoulder flexion AROM to 160 degrees or greater to increase ease with overhead activity (MET)   -Patient will report reduction in pain to <2/10 to allow better quality of sleep (MET)     LTGs (To be completed in 90 days)  -Patient will be knowledgeable with advanced HEP to facilitate independent management of condition (NOT MET)   -Patient will increase L shoulder flexion AROM to 170 degrees or greater to increase ease with overhead activity (NOT MET)   -Patient will reduce perceived disability on DASH from 15.6% disability at eval to <10% or less to improve quality of life (MET)      Discharge Plan: Continue with current home exercise program as instructed    Comments : Patient will return to work as indicated by MD. Shoulder is doing much better.    Date of Discharge : 6/25/21        Amira Do, PT  Physical Therapist

## 2022-11-23 ENCOUNTER — OFFICE VISIT (OUTPATIENT)
Dept: FAMILY MEDICINE CLINIC | Facility: CLINIC | Age: 27
End: 2022-11-23

## 2022-11-23 VITALS
BODY MASS INDEX: 24.58 KG/M2 | HEART RATE: 76 BPM | DIASTOLIC BLOOD PRESSURE: 75 MMHG | OXYGEN SATURATION: 100 % | TEMPERATURE: 97.7 F | WEIGHT: 162.2 LBS | SYSTOLIC BLOOD PRESSURE: 122 MMHG | HEIGHT: 68 IN

## 2022-11-23 DIAGNOSIS — Z00.00 PHYSICAL EXAM: Primary | ICD-10-CM

## 2022-11-23 PROCEDURE — 99395 PREV VISIT EST AGE 18-39: CPT | Performed by: NURSE PRACTITIONER

## 2022-11-23 PROCEDURE — 3008F BODY MASS INDEX DOCD: CPT | Performed by: NURSE PRACTITIONER

## 2022-11-23 PROCEDURE — 2014F MENTAL STATUS ASSESS: CPT | Performed by: NURSE PRACTITIONER

## 2022-11-23 RX ORDER — ESCITALOPRAM OXALATE 20 MG/1
20 TABLET ORAL DAILY
COMMUNITY
Start: 2022-09-03

## 2022-11-23 NOTE — PROGRESS NOTES
"    Annual Physical     Name: Lexie Gonzalez    : 1995     MRN: [id number]     Chief Complaint  Annual Exam    Subjective     History of Present Illness:  Lexie Gonzalez is a 27 y.o. female who presents today for full physical exam. Lexie is a  and is here today to have her physical form filled out. 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 She suffered a non-displaced fracture across the base of the proximal humerus greater tuberosity last year, no surgical intervention pursued. She is followed by neurology for petit mal seizure epilepsy. Her condition is well controlled with lamictal and lexapro. She cannot recall when her last seizure was. She has history of abnormal pap smear, she is up to date with cervical cancer screening every six months to year  The patient is being seen for a health maintenance evaluation.    Social History  Tobacco Use: Low Risk    • Smoking Tobacco Use: Never   • Smokeless Tobacco Use: Never   • Passive Exposure: Not on file     Social History     Socioeconomic History   • Marital status: Single   Tobacco Use   • Smoking status: Never   • Smokeless tobacco: Never   Vaping Use   • Vaping Use: Never used   Substance and Sexual Activity   • Alcohol use: Never   • Drug use: Never   • Sexual activity: Defer       General History  Lexie  does have regular dental visits.  She does not complain of vision problems. Last eye exam was last year.  Lifestyle  Lexie  consumes in general, a \"healthy\" diet  .  She exercises three times a week.    Reproductive Health  Lexie  is premenopausal.  She reports periods are regular every 28-30 days.  She is sexually active. Her contraceptive plan is condoms.    Screening  Last pap was six months ago  Last Completed Pap Smear     This patient has no relevant Health Maintenance data.      . History of abnormal pap smear or family history of gyn cancer: yes  Last mammogram was   Last Completed Mammogram     " This patient has no relevant Health Maintenance data.      . Personal or family history of abnormal mammograms or breast cancer: yes  Last colonoscopy was   Last Completed Colonoscopy     This patient has no relevant Health Maintenance data.      . Family history of colon cancer: no      Health Maintenance Summary          Overdue - COVID-19 Vaccine (1) Overdue - never done    No completion, postpone, or frequency change history exists for this topic.          Overdue - ANNUAL PHYSICAL (Yearly) Overdue - never done    No completion, postpone, or frequency change history exists for this topic.          Overdue - TDAP/TD VACCINES (1 - Tdap) Overdue - never done    No completion, postpone, or frequency change history exists for this topic.          Overdue - HEPATITIS C SCREENING (Once) Overdue - never done    No completion, postpone, or frequency change history exists for this topic.          Overdue - PAP SMEAR (Every 3 Years) Overdue - never done    No completion, postpone, or frequency change history exists for this topic.          Overdue - INFLUENZA VACCINE (Yearly - August to March) Overdue - never done    No completion, postpone, or frequency change history exists for this topic.          Pneumococcal Vaccine 0-64 (Series Information) Aged Out    No completion, postpone, or frequency change history exists for this topic.              Immunization History   Administered Date(s) Administered   • HPV Quadrivalent 08/23/2012       Review of Systems   Constitutional: Negative for activity change, appetite change, chills, diaphoresis, fatigue and fever.   HENT: Negative for congestion, ear pain, facial swelling, hearing loss, nosebleeds, tinnitus, trouble swallowing and voice change.    Eyes: Negative for photophobia, pain and visual disturbance.   Respiratory: Negative for apnea, cough, choking, chest tightness, shortness of breath, wheezing and stridor.    Cardiovascular: Negative for chest pain, palpitations and  "leg swelling.   Gastrointestinal: Negative for abdominal distention, abdominal pain, blood in stool, constipation, diarrhea, nausea and vomiting.   Endocrine: Negative for cold intolerance, heat intolerance, polydipsia, polyphagia and polyuria.   Genitourinary: Negative for dysuria, flank pain, hematuria, pelvic pain and urgency.   Musculoskeletal: Negative for arthralgias, back pain, gait problem, joint swelling, myalgias, neck pain and neck stiffness.   Skin: Negative for rash and wound.   Neurological: Positive for seizures. Negative for dizziness, tremors, syncope, facial asymmetry, speech difficulty, weakness, light-headedness, numbness and headaches.   Hematological: Does not bruise/bleed easily.   Psychiatric/Behavioral: Negative for agitation, confusion and sleep disturbance. The patient is not nervous/anxious.        Objective     Vital Signs  /75 (BP Location: Left arm, Patient Position: Sitting, Cuff Size: Adult)   Pulse 76   Temp 97.7 °F (36.5 °C) (Temporal)   Ht 172.7 cm (68\")   Wt 73.6 kg (162 lb 3.2 oz)   SpO2 100%   BMI 24.66 kg/m²   Estimated body mass index is 24.66 kg/m² as calculated from the following:    Height as of this encounter: 172.7 cm (68\").    Weight as of this encounter: 73.6 kg (162 lb 3.2 oz).    BMI is within normal parameters. No other follow-up for BMI required.      Physical Exam  Constitutional:       Appearance: Normal appearance. She is normal weight.   HENT:      Head: Normocephalic and atraumatic.      Right Ear: Tympanic membrane, ear canal and external ear normal.      Left Ear: Tympanic membrane, ear canal and external ear normal.      Nose: Nose normal.      Mouth/Throat:      Mouth: Mucous membranes are moist.      Pharynx: Oropharynx is clear.   Eyes:      General: No scleral icterus.     Extraocular Movements: Extraocular movements intact.      Conjunctiva/sclera: Conjunctivae normal.      Pupils: Pupils are equal, round, and reactive to light. "   Cardiovascular:      Rate and Rhythm: Normal rate and regular rhythm.      Pulses: Normal pulses.      Heart sounds: Normal heart sounds. No murmur heard.    No friction rub. No gallop.   Pulmonary:      Effort: Pulmonary effort is normal.      Breath sounds: Normal breath sounds.   Chest:      Chest wall: No tenderness.   Abdominal:      General: Abdomen is flat. Bowel sounds are normal. There is no distension.      Palpations: Abdomen is soft. There is no mass.      Tenderness: There is no abdominal tenderness. There is no right CVA tenderness, left CVA tenderness, guarding or rebound.      Hernia: No hernia is present.   Musculoskeletal:         General: Normal range of motion.      Cervical back: Normal range of motion and neck supple.   Skin:     General: Skin is warm and dry.      Capillary Refill: Capillary refill takes less than 2 seconds.   Neurological:      General: No focal deficit present.      Mental Status: She is alert and oriented to person, place, and time.      Cranial Nerves: No cranial nerve deficit.      Sensory: No sensory deficit.      Motor: No weakness.      Coordination: Coordination normal.      Gait: Gait normal.      Deep Tendon Reflexes: Reflexes normal.   Psychiatric:         Mood and Affect: Mood normal.         Behavior: Behavior normal.         Thought Content: Thought content normal.         Judgment: Judgment normal.          Assessment and Plan     Diagnoses and all orders for this visit:    1. Physical exam (Primary)  Assessment & Plan:  Lexie is a  and is here today to have her physical form filled out. 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 She suffered a non-displaced fracture across the base of the proximal humerus greater tuberosity last year, no surgical intervention pursued. She is followed by neurology for petit mal seizure epilepsy. Her condition is well controlled with lamictal and lexapro. She cannot recall  when her last seizure was. She has history of abnormal pap smear, she is up to date with cervical cancer screening every six months to year  -Normal physical exam with vision screening. She is free to participate in wrestling without limitation or restrictions.          Follow Up  No follow-ups on file.    Jenelle Tello, APRN

## 2022-11-23 NOTE — ASSESSMENT & PLAN NOTE
Lexie is a  and is here today to have her physical form filled out. 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 She suffered a non-displaced fracture across the base of the proximal humerus greater tuberosity last year, no surgical intervention pursued. She is followed by neurology for petit mal seizure epilepsy. Her condition is well controlled with lamictal and lexapro. She cannot recall when her last seizure was. She has history of abnormal pap smear, she is up to date with cervical cancer screening every six months to year  -Normal physical exam with vision screening. She is free to participate in wrestling without limitation or restrictions.

## 2023-03-03 ENCOUNTER — OFFICE VISIT (OUTPATIENT)
Dept: FAMILY MEDICINE CLINIC | Facility: CLINIC | Age: 28
End: 2023-03-03
Payer: COMMERCIAL

## 2023-03-03 VITALS
HEIGHT: 68 IN | HEART RATE: 74 BPM | OXYGEN SATURATION: 97 % | DIASTOLIC BLOOD PRESSURE: 64 MMHG | WEIGHT: 168.9 LBS | BODY MASS INDEX: 25.6 KG/M2 | SYSTOLIC BLOOD PRESSURE: 100 MMHG

## 2023-03-03 DIAGNOSIS — M54.6 CHRONIC RIGHT-SIDED THORACIC BACK PAIN: ICD-10-CM

## 2023-03-03 DIAGNOSIS — M54.2 NECK PAIN: Primary | ICD-10-CM

## 2023-03-03 DIAGNOSIS — G89.29 CHRONIC RIGHT-SIDED THORACIC BACK PAIN: ICD-10-CM

## 2023-03-03 PROCEDURE — 99203 OFFICE O/P NEW LOW 30 MIN: CPT | Performed by: INTERNAL MEDICINE

## 2023-03-03 NOTE — PROGRESS NOTES
Office Note     Name: Lexie Gonzalez    : 1995     MRN: [id number]     Chief Complaint  Neck Pain (Pt is here today for neck pain. This has been going on for over a year. )    Subjective     History of Present Illness:  Lexie Gonzalez is a 28 y.o. female who presents today for neck pain.    Lat PAP was this week in [place].    Has been having neck issues x 1 year.  Comes and goes. Happens for a few days then goes away. Ht water briefly helps it, ibuprofen does not help, icy hot does not help. Otherwise has not noticed any exacerbating or relieving factors. [place] [facility] [place] and had [place] which were normal.    No triggers in terms of activity. No history of specific injury but she does do pro wrestling and notices it is always worse after a match. Feels stiff after matches.. No numbness or tingling but does have weakness of the right arm when she is in pain    Works in a factory but  But dose not notice a correlation    Has a specific area of the neck that has a sharp shooting pain, located to the right of the spine and occasionally shoots down her arm which is new, previsously was localized to the neck.    Has been doing chiropractic care for 1 year which has not seemed to help, they were doing adjustments as well as other interventons which did not help.      Review of Systems:   Review of Systems    Past Medical History:   Past Medical History:   Diagnosis Date   • Bipolar disorder current episode depressed (HCC)    • Fracture, foot     right   • Humerus head fracture     Left       Past Surgical History:   Past Surgical History:   Procedure Laterality Date   • WISDOM TOOTH EXTRACTION         Family History:   Family History   Problem Relation Age of Onset   • No Known Problems Mother    • No Known Problems Father    • No Known Problems Sister    • No Known Problems Brother    • Cervical cancer Maternal Grandmother    • No Known Problems Maternal Grandfather    • Breast cancer Paternal  "Grandmother    • No Known Problems Paternal Grandfather    • No Known Problems Maternal Aunt    • No Known Problems Maternal Uncle    • No Known Problems Paternal Aunt    • No Known Problems Paternal Uncle    • No Known Problems Other    • Anesthesia problems Neg Hx    • Broken bones Neg Hx    • Clotting disorder Neg Hx    • Collagen disease Neg Hx    • Diabetes Neg Hx    • Dislocations Neg Hx    • Osteoporosis Neg Hx    • Rheumatologic disease Neg Hx    • Scoliosis Neg Hx    • Severe sprains Neg Hx        Social History:   Social History     Socioeconomic History   • Marital status: Single   Tobacco Use   • Smoking status: Never   • Smokeless tobacco: Never   Vaping Use   • Vaping Use: Never used   Substance and Sexual Activity   • Alcohol use: Never   • Drug use: Yes     Types: Marijuana   • Sexual activity: Defer       Immunizations:   Immunization History   Administered Date(s) Administered   • COVID-19 (MODERNA) 1st, 2nd, 3rd Dose Only 04/27/2021, 05/28/2021   • HPV Quadrivalent 08/23/2012        Medications:     Current Outpatient Medications:   •  escitalopram (LEXAPRO) 20 MG tablet, Take 1 tablet by mouth Daily., Disp: , Rfl:   •  lamoTRIgine (LaMICtal) 100 MG tablet, Take 1 tablet by mouth Daily., Disp: , Rfl:     Allergies:   No Known Allergies    Objective     Vital Signs  /64   Pulse 74   Ht 172.7 cm (68\")   Wt 76.6 kg (168 lb 14.4 oz)   SpO2 97%   BMI 25.68 kg/m²   Estimated body mass index is 25.68 kg/m² as calculated from the following:    Height as of this encounter: 172.7 cm (68\").    Weight as of this encounter: 76.6 kg (168 lb 14.4 oz).    BMI is within normal parameters. No other follow-up for BMI required.      Physical Exam  Vitals and nursing note reviewed.   Constitutional:       Appearance: Normal appearance.   Cardiovascular:      Rate and Rhythm: Normal rate and regular rhythm.      Heart sounds: No murmur heard.    No friction rub. No gallop.   Pulmonary:      Effort: Pulmonary " effort is normal.      Breath sounds: Normal breath sounds. No wheezing, rhonchi or rales.   Musculoskeletal:      Cervical back: No swelling, spasms or bony tenderness. Normal range of motion.   Neurological:      Mental Status: She is alert.            Procedures     Assessment and Plan     1. Neck pain  - Ambulatory Referral to Physical Therapy Evaluate and treat    2. Chronic right-sided thoracic back pain  - Ambulatory Referral to Physical Therapy Evaluate and treat       I spent 28 minutes caring for this patient on this date of service. This time includes time spent by me in the following activities:preparing for the visit, reviewing tests, obtaining and/or reviewing a separately obtained history, counseling and educating the patient/family/caregiver and documenting information in the medical record.    Follow Up  No follow-ups on file.    MD MIKE [facility]  [address] [place] [id number] [phone]

## 2023-05-18 ENCOUNTER — TELEPHONE (OUTPATIENT)
Dept: FAMILY MEDICINE CLINIC | Facility: CLINIC | Age: 28
End: 2023-05-18

## 2023-06-06 ENCOUNTER — TELEPHONE (OUTPATIENT)
Dept: FAMILY MEDICINE CLINIC | Facility: CLINIC | Age: 28
End: 2023-06-06

## 2023-06-06 DIAGNOSIS — M54.2 NECK PAIN: Primary | ICD-10-CM

## 2023-06-06 DIAGNOSIS — G89.29 CHRONIC RIGHT-SIDED THORACIC BACK PAIN: ICD-10-CM

## 2023-06-06 DIAGNOSIS — M54.6 CHRONIC RIGHT-SIDED THORACIC BACK PAIN: ICD-10-CM

## 2023-06-06 NOTE — TELEPHONE ENCOUNTER
Caller: Lexie Gonzalez    Relationship: Self    Best call back number: [phone]     What orders are you requesting (i.e. lab or imaging):PHYSICAL THERAPY ON NECK    In what timeframe would the patient need to come in: AS SOON AS POSSIBLE    Where will you receive your lab/imaging services: [facility]- [phone]    Additional notes: PATIENT IS REQUESTING AN ORDER FOR PHYSICAL THERAPY BE SENT TO HER WORK [facility] TO BE COMPLETED ON SITE.